# Patient Record
Sex: FEMALE | Race: OTHER | NOT HISPANIC OR LATINO | URBAN - METROPOLITAN AREA
[De-identification: names, ages, dates, MRNs, and addresses within clinical notes are randomized per-mention and may not be internally consistent; named-entity substitution may affect disease eponyms.]

---

## 2021-10-26 ENCOUNTER — EMERGENCY (EMERGENCY)
Facility: HOSPITAL | Age: 46
LOS: 1 days | Discharge: HOME | End: 2021-10-26
Attending: EMERGENCY MEDICINE
Payer: SELF-PAY

## 2021-10-26 VITALS
DIASTOLIC BLOOD PRESSURE: 92 MMHG | HEIGHT: 67 IN | WEIGHT: 149.91 LBS | OXYGEN SATURATION: 98 % | HEART RATE: 81 BPM | RESPIRATION RATE: 17 BRPM | SYSTOLIC BLOOD PRESSURE: 153 MMHG | TEMPERATURE: 98 F

## 2021-10-26 PROCEDURE — 99283 EMERGENCY DEPT VISIT LOW MDM: CPT

## 2021-10-26 RX ORDER — AMOXICILLIN 250 MG/5ML
1 SUSPENSION, RECONSTITUTED, ORAL (ML) ORAL
Qty: 10 | Refills: 0
Start: 2021-10-26 | End: 2021-10-30

## 2021-10-26 NOTE — ED PROVIDER NOTE - OBJECTIVE STATEMENT
46 year old female with no history presents with b/l Ear pain, L more pain than R x2 months. Pain is worse at nightime. No medication taken for pain. Denies fevers, HA, dizziness, discharge, use of qtips. 46 year old female with no history presents with b/l Ear pain, L more pain than R x2 months. Pain is worse at nightime. No medication taken for pain. Denies fevers, HA, dizziness, discharge, use of qtips.    LYUDMILA: left ear pain.

## 2021-10-26 NOTE — ED PROVIDER NOTE - CLINICAL SUMMARY MEDICAL DECISION MAKING FREE TEXT BOX
46 year old female with b/l ear pain. Right ear normal. Left ear - TNTC 1 mm white papules on TM. Will treat with antibiotics for otitis media and refer patient to ENT for further evaluation.

## 2021-10-26 NOTE — ED PROVIDER NOTE - NSFOLLOWUPCLINICS_GEN_ALL_ED_FT
[facility]  ENT  [address] [place] [address]  Phone: [phone]  Fax: [phone]

## 2021-10-26 NOTE — ED PROVIDER NOTE - NSFOLLOWUPINSTRUCTIONS_ED_ALL_ED_FT
Follow up with the ENT doctor with in 24-48 hours.     Take antibiotics as prescribed, until all of the medicine is gone.     If you experience any new or worsening symptoms or if you are concerned you can always come back to the emergency for a re-evaluation.    If there were any prescriptions given to you during the visit today take them as prescribed. If you have any questions you can ask the pharmacist.

## 2021-10-26 NOTE — ED PROVIDER NOTE - CROS ED ROS STATEMENT
all other ROS negative except as per HPI

## 2021-10-26 NOTE — ED PROVIDER NOTE - PHYSICAL EXAMINATION
Left ear: TNTC 1 mm white papules on TM. Visualized parts of middle ear are normal. No erythema, swelling or exudate of external ear canal.    Right ear - normal. TM clear. Left ear: TNTC 1 mm white papules on TM. Visualized parts of middle ear are normal. No erythema, swelling or exudate of external ear canal.    Right ear - normal. TM clear.    LYUDMILA: left ear - TNTC 1mm white papules. no hyphae, no or swelling erythema of ear canal. no pain with manipulation of ear.

## 2021-10-26 NOTE — ED PROVIDER NOTE - PATIENT PORTAL LINK FT
You can access the FollowMyHealth Patient Portal offered by [facility] by registering at the following website: [email or web address]. By joining [employer]’s FollowMyHealth portal, you will also be able to view your health information using other applications (apps) compatible with our system.

## 2021-11-08 ENCOUNTER — RESULT REVIEW (OUTPATIENT)
Age: 46
End: 2021-11-08

## 2021-11-08 ENCOUNTER — OUTPATIENT (OUTPATIENT)
Dept: OUTPATIENT SERVICES | Facility: HOSPITAL | Age: 46
LOS: 1 days | Discharge: HOME | End: 2021-11-08

## 2021-11-08 ENCOUNTER — OUTPATIENT (OUTPATIENT)
Dept: OUTPATIENT SERVICES | Facility: HOSPITAL | Age: 46
LOS: 1 days | End: 2021-11-08
Payer: COMMERCIAL

## 2021-11-08 ENCOUNTER — APPOINTMENT (OUTPATIENT)
Dept: ULTRASOUND IMAGING | Facility: CLINIC | Age: 46
End: 2021-11-08
Payer: COMMERCIAL

## 2021-11-08 ENCOUNTER — APPOINTMENT (OUTPATIENT)
Dept: OTOLARYNGOLOGY | Facility: CLINIC | Age: 46
End: 2021-11-08
Payer: COMMERCIAL

## 2021-11-08 VITALS
HEIGHT: 67 IN | HEART RATE: 81 BPM | SYSTOLIC BLOOD PRESSURE: 126 MMHG | BODY MASS INDEX: 23.54 KG/M2 | WEIGHT: 150 LBS | DIASTOLIC BLOOD PRESSURE: 75 MMHG

## 2021-11-08 DIAGNOSIS — Z86.39 PERSONAL HISTORY OF OTHER ENDOCRINE, NUTRITIONAL AND METABOLIC DISEASE: ICD-10-CM

## 2021-11-08 DIAGNOSIS — E04.9 NONTOXIC GOITER, UNSPECIFIED: ICD-10-CM

## 2021-11-08 PROBLEM — Z78.9 OTHER SPECIFIED HEALTH STATUS: Chronic | Status: ACTIVE | Noted: 2021-10-26

## 2021-11-08 PROCEDURE — 76536 US EXAM OF HEAD AND NECK: CPT

## 2021-11-08 PROCEDURE — 76536 US EXAM OF HEAD AND NECK: CPT | Mod: 26

## 2021-11-08 PROCEDURE — 99203 OFFICE O/P NEW LOW 30 MIN: CPT

## 2021-11-08 RX ORDER — ASCORBIC ACID 500 MG
TABLET ORAL
Refills: 0 | Status: ACTIVE | COMMUNITY

## 2021-11-08 NOTE — ASSESSMENT
[FreeTextEntry1] : treated otitis\par thyroid disorder\par us thyroid\par endocrinolgy referral\par f/u after above

## 2021-11-08 NOTE — REASON FOR VISIT
[Initial Evaluation] : an initial evaluation for [Formal Caregiver] : formal caregiver [FreeTextEntry2] : Left otitis media

## 2021-11-08 NOTE — HISTORY OF PRESENT ILLNESS
[de-identified] : 46 year old female initial visit for Left otitis media, associated symptoms of otalgia, otorrhea and itching\par States symptoms present for approximately 2 weeks, went to [facility], prescribed oral Amoxicillin, taken with relief. Denies changes with hearing and recent fevers. Also has R thyroid goiter which she states she has been aware of since 2017, [place] which [place] states was benign.

## 2021-11-08 NOTE — PHYSICAL EXAM
[Midline] : trachea located in midline position [Normal] : no rashes [de-identified] : R thyroid enlarged

## 2022-01-03 ENCOUNTER — APPOINTMENT (OUTPATIENT)
Dept: OTOLARYNGOLOGY | Facility: CLINIC | Age: 47
End: 2022-01-03

## 2022-01-05 DIAGNOSIS — E07.9 DISORDER OF THYROID, UNSPECIFIED: ICD-10-CM

## 2022-01-05 DIAGNOSIS — H66.90 OTITIS MEDIA, UNSPECIFIED, UNSPECIFIED EAR: ICD-10-CM

## 2022-02-09 ENCOUNTER — APPOINTMENT (OUTPATIENT)
Dept: ENDOCRINOLOGY | Facility: CLINIC | Age: 47
End: 2022-02-09
Payer: COMMERCIAL

## 2022-02-09 PROCEDURE — 99204 OFFICE O/P NEW MOD 45 MIN: CPT

## 2022-04-07 ENCOUNTER — APPOINTMENT (OUTPATIENT)
Dept: SURGERY | Facility: CLINIC | Age: 47
End: 2022-04-07
Payer: COMMERCIAL

## 2022-04-07 DIAGNOSIS — E04.9 NONTOXIC GOITER, UNSPECIFIED: ICD-10-CM

## 2022-04-07 DIAGNOSIS — E04.2 NONTOXIC MULTINODULAR GOITER: ICD-10-CM

## 2022-04-07 DIAGNOSIS — E07.9 DISORDER OF THYROID, UNSPECIFIED: ICD-10-CM

## 2022-04-07 PROCEDURE — 99204 OFFICE O/P NEW MOD 45 MIN: CPT

## 2022-04-07 NOTE — REASON FOR VISIT
[Initial Consultation] : an initial consultation for [FreeTextEntry2] : a multinodular goiter [Friend] : friend

## 2022-04-07 NOTE — PHYSICAL EXAM
[Midline] : located in midline position [Normal] : orientation to person, place, and time: normal [de-identified] : Extremities: GUNN x 4.   Skin: No obvious skin lesions.   Voice: clear

## 2022-04-13 ENCOUNTER — APPOINTMENT (OUTPATIENT)
Dept: SURGERY | Facility: CLINIC | Age: 47
End: 2022-04-13
Payer: COMMERCIAL

## 2022-04-13 PROCEDURE — 99442: CPT

## 2022-04-13 NOTE — REASON FOR VISIT
[Friend] : friend [Verbal consent obtained from patient] : the patient, [unfilled] [Follow-Up: _____] : a [unfilled] follow-up visit

## 2022-04-22 ENCOUNTER — APPOINTMENT (OUTPATIENT)
Dept: CT IMAGING | Facility: CLINIC | Age: 47
End: 2022-04-22
Payer: COMMERCIAL

## 2022-04-22 ENCOUNTER — OUTPATIENT (OUTPATIENT)
Dept: OUTPATIENT SERVICES | Facility: HOSPITAL | Age: 47
LOS: 1 days | End: 2022-04-22
Payer: COMMERCIAL

## 2022-04-22 DIAGNOSIS — E04.2 NONTOXIC MULTINODULAR GOITER: ICD-10-CM

## 2022-04-22 PROCEDURE — 70491 CT SOFT TISSUE NECK W/DYE: CPT

## 2022-04-22 PROCEDURE — 70491 CT SOFT TISSUE NECK W/DYE: CPT | Mod: 26

## 2022-04-26 ENCOUNTER — NON-APPOINTMENT (OUTPATIENT)
Age: 47
End: 2022-04-26

## 2022-05-02 ENCOUNTER — APPOINTMENT (OUTPATIENT)
Dept: SURGERY | Facility: CLINIC | Age: 47
End: 2022-05-02

## 2022-05-05 ENCOUNTER — APPOINTMENT (OUTPATIENT)
Dept: SURGERY | Facility: CLINIC | Age: 47
End: 2022-05-05
Payer: COMMERCIAL

## 2022-05-05 PROCEDURE — 99441: CPT

## 2022-05-16 ENCOUNTER — NON-APPOINTMENT (OUTPATIENT)
Age: 47
End: 2022-05-16

## 2022-05-24 ENCOUNTER — APPOINTMENT (OUTPATIENT)
Dept: INTERNAL MEDICINE | Facility: CLINIC | Age: 47
End: 2022-05-24
Payer: COMMERCIAL

## 2022-05-24 ENCOUNTER — OUTPATIENT (OUTPATIENT)
Dept: OUTPATIENT SERVICES | Facility: HOSPITAL | Age: 47
LOS: 1 days | End: 2022-05-24
Payer: SELF-PAY

## 2022-05-24 VITALS
DIASTOLIC BLOOD PRESSURE: 72 MMHG | BODY MASS INDEX: 24.17 KG/M2 | WEIGHT: 154 LBS | SYSTOLIC BLOOD PRESSURE: 146 MMHG | HEIGHT: 67 IN | HEART RATE: 96 BPM | OXYGEN SATURATION: 98 %

## 2022-05-24 DIAGNOSIS — Z00.00 ENCOUNTER FOR GENERAL ADULT MEDICAL EXAMINATION W/OUT ABNORMAL FINDINGS: ICD-10-CM

## 2022-05-24 DIAGNOSIS — R91.1 SOLITARY PULMONARY NODULE: ICD-10-CM

## 2022-05-24 DIAGNOSIS — Z82.49 FAMILY HISTORY OF ISCHEMIC HEART DISEASE AND OTHER DISEASES OF THE CIRCULATORY SYSTEM: ICD-10-CM

## 2022-05-24 DIAGNOSIS — H92.12 OTORRHEA, LEFT EAR: ICD-10-CM

## 2022-05-24 DIAGNOSIS — I10 ESSENTIAL (PRIMARY) HYPERTENSION: ICD-10-CM

## 2022-05-24 DIAGNOSIS — Z86.69 PERSONAL HISTORY OF OTHER DISEASES OF THE NERVOUS SYSTEM AND SENSE ORGANS: ICD-10-CM

## 2022-05-24 DIAGNOSIS — L29.9 PRURITUS, UNSPECIFIED: ICD-10-CM

## 2022-05-24 DIAGNOSIS — R91.8 OTHER NONSPECIFIC ABNORMAL FINDING OF LUNG FIELD: ICD-10-CM

## 2022-05-24 DIAGNOSIS — H92.02 OTALGIA, LEFT EAR: ICD-10-CM

## 2022-05-24 PROCEDURE — 83036 HEMOGLOBIN GLYCOSYLATED A1C: CPT

## 2022-05-24 PROCEDURE — ZZZZZ: CPT

## 2022-05-24 PROCEDURE — 86803 HEPATITIS C AB TEST: CPT

## 2022-05-24 PROCEDURE — 85025 COMPLETE CBC W/AUTO DIFF WBC: CPT

## 2022-05-24 PROCEDURE — 80061 LIPID PANEL: CPT

## 2022-05-24 PROCEDURE — 87389 HIV-1 AG W/HIV-1&-2 AB AG IA: CPT

## 2022-05-24 PROCEDURE — 80053 COMPREHEN METABOLIC PANEL: CPT

## 2022-05-24 PROCEDURE — G0463: CPT

## 2022-05-24 NOTE — PHYSICAL EXAM
[No Acute Distress] : no acute distress [Well Nourished] : well nourished [Well Developed] : well developed [Well-Appearing] : well-appearing [Normal Sclera/Conjunctiva] : normal sclera/conjunctiva [PERRL] : pupils equal round and reactive to light [EOMI] : extraocular movements intact [Normal Outer Ear/Nose] : the outer ears and nose were normal in appearance [Normal Oropharynx] : the oropharynx was normal [Normal TMs] : both tympanic membranes were normal [No Respiratory Distress] : no respiratory distress  [No Accessory Muscle Use] : no accessory muscle use [Clear to Auscultation] : lungs were clear to auscultation bilaterally [Normal Rate] : normal rate  [Regular Rhythm] : with a regular rhythm [Normal S1, S2] : normal S1 and S2 [No Murmur] : no murmur heard [No Edema] : there was no peripheral edema [Soft] : abdomen soft [Non Tender] : non-tender [No HSM] : no HSM [Normal Bowel Sounds] : normal bowel sounds [No CVA Tenderness] : no CVA  tenderness [No Spinal Tenderness] : no spinal tenderness [No Joint Swelling] : no joint swelling [Grossly Normal Strength/Tone] : grossly normal strength/tone [No Rash] : no rash [Coordination Grossly Intact] : coordination grossly intact [No Focal Deficits] : no focal deficits [Normal Gait] : normal gait [Normal Affect] : the affect was normal [Normal Insight/Judgement] : insight and judgment were intact [de-identified] : No exopthalmos [de-identified] : Bilateral diffuse enlarged goiter with lympahdenopathy [de-identified] : Midly distended in lower abdomen [de-identified] : Difficult to palpation lymph nodes on neck, however appreciated on recent CT of neck

## 2022-05-24 NOTE — REVIEW OF SYSTEMS
[Swollen Glands] : swollen glands [Fever] : no fever [Chills] : no chills [Fatigue] : no fatigue [Recent Change In Weight] : ~T no recent weight change [Discharge] : no discharge [Pain] : no pain [Vision Problems] : no vision problems [Earache] : no earache [Hearing Loss] : no hearing loss [Nasal Discharge] : no nasal discharge [Sore Throat] : no sore throat [Chest Pain] : no chest pain [Palpitations] : no palpitations [Lower Ext Edema] : no lower extremity edema [Shortness Of Breath] : no shortness of breath [Wheezing] : no wheezing [Cough] : no cough [Abdominal Pain] : no abdominal pain [Nausea] : no nausea [Constipation] : no constipation [Diarrhea] : diarrhea [Vomiting] : no vomiting [Dysuria] : no dysuria [Vaginal Discharge] : no vaginal discharge [Joint Pain] : no joint pain [Joint Swelling] : no joint swelling [Mole Changes] : no mole changes [Nail Changes] : no nail changes [Hair Changes] : no hair changes [Skin Rash] : no skin rash [Headache] : no headache [Dizziness] : no dizziness [Fainting] : no fainting [Memory Loss] : no memory loss [Unsteady Walking] : no ataxia [Anxiety] : no anxiety [Depression] : no depression [de-identified] : present in the neck

## 2022-05-24 NOTE — ASSESSMENT
[FreeTextEntry1] : 45 y/o female with pmh of enlarged thyroid with large bilateral nodules with mild compression on the trachea, enlarged right lateral neck mass, and incidental pulmonary nodules who presents to the clinic for new pt CPE and to establish care at the medicine clinic for the first time.

## 2022-05-24 NOTE — HISTORY OF PRESENT ILLNESS
[FreeTextEntry1] : New pt CPE [de-identified] : 45 y/o female with pmh of enlarged thyroid with large bilateral nodules with mild compression on the trachea, enlarged right lateral neck mass, and incidental pulmonary nodules who presents to the clinic for new pt CPE and to establish care at the medicine clinic for the first time.\par To note pt has a medical history of enlarged thyroid which was identified incidentally by ENT at an appointment for otitis media. The pt had an U/S done in 11/2021 which demonstrated cystic appearing nodules (4.5 cm on the right, 4.3 cm medial on the left, and 3.6 cm lateral on the left). She was referred to endocrinology and was worked up for thyroid studies and recommended to have an FNA at the time given the nodules were >1 cm in size, however there was a concern that insurance would not cover surgery or anesthesia. The pt was referred to head and neck surgery, who ordered a CT scan on 5/17 which demonstrated cystic soft tissue foci in lower right neck, enlarged thyroid glands with bilateral nodules with mild tracheal compression, now recommended a total thyroidectomy as the pt had cyst with >4 cm which is likely to indicate malignancy. The pt was agreeable to surgery after giving more thought to it and is pending a repeat U/S and MRI of the neck (5/26/22). 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 \par Today the pt is feeling well without any acute concerns. The pt denies having any hair, nail, or skin changes, palpitations, constipation, diarrhea, or menstrual cycle changes.

## 2022-05-24 NOTE — PLAN
[FreeTextEntry1] : \par Return to clinic when due for pre-operative clearance\par \par Case discussed with Dr. Sullivan\par \par Stephanie Galvez MD, PGY2\par Internal Medicine

## 2022-05-26 ENCOUNTER — APPOINTMENT (OUTPATIENT)
Dept: ULTRASOUND IMAGING | Facility: CLINIC | Age: 47
End: 2022-05-26
Payer: COMMERCIAL

## 2022-05-26 ENCOUNTER — OUTPATIENT (OUTPATIENT)
Dept: OUTPATIENT SERVICES | Facility: HOSPITAL | Age: 47
LOS: 1 days | End: 2022-05-26
Payer: SELF-PAY

## 2022-05-26 ENCOUNTER — APPOINTMENT (OUTPATIENT)
Dept: CT IMAGING | Facility: CLINIC | Age: 47
End: 2022-05-26

## 2022-05-26 ENCOUNTER — RESULT REVIEW (OUTPATIENT)
Age: 47
End: 2022-05-26

## 2022-05-26 ENCOUNTER — APPOINTMENT (OUTPATIENT)
Dept: MRI IMAGING | Facility: CLINIC | Age: 47
End: 2022-05-26
Payer: COMMERCIAL

## 2022-05-26 DIAGNOSIS — R22.1 LOCALIZED SWELLING, MASS AND LUMP, NECK: ICD-10-CM

## 2022-05-26 DIAGNOSIS — I10 ESSENTIAL (PRIMARY) HYPERTENSION: ICD-10-CM

## 2022-05-26 PROCEDURE — 70543 MRI ORBT/FAC/NCK W/O &W/DYE: CPT | Mod: 26

## 2022-05-26 PROCEDURE — 76536 US EXAM OF HEAD AND NECK: CPT | Mod: 26

## 2022-05-26 PROCEDURE — 82180 ASSAY OF ASCORBIC ACID: CPT

## 2022-05-26 PROCEDURE — 74261 CT COLONOGRAPHY DX: CPT

## 2022-05-26 PROCEDURE — 71250 CT THORAX DX C-: CPT | Mod: 26

## 2022-05-26 PROCEDURE — A9585: CPT

## 2022-05-26 PROCEDURE — 71250 CT THORAX DX C-: CPT

## 2022-05-26 PROCEDURE — 70543 MRI ORBT/FAC/NCK W/O &W/DYE: CPT

## 2022-05-26 PROCEDURE — 76536 US EXAM OF HEAD AND NECK: CPT

## 2022-05-27 ENCOUNTER — NON-APPOINTMENT (OUTPATIENT)
Age: 47
End: 2022-05-27

## 2022-05-27 DIAGNOSIS — D50.9 IRON DEFICIENCY ANEMIA, UNSPECIFIED: ICD-10-CM

## 2022-05-27 LAB
ALBUMIN SERPL ELPH-MCNC: 4.8 G/DL
ALP BLD-CCNC: 61 U/L
ALT SERPL-CCNC: 11 U/L
ANION GAP SERPL CALC-SCNC: 13 MMOL/L
AST SERPL-CCNC: 15 U/L
BASOPHILS # BLD AUTO: 0.05 K/UL
BASOPHILS NFR BLD AUTO: 0.8 %
BILIRUB SERPL-MCNC: 0.2 MG/DL
BUN SERPL-MCNC: 10 MG/DL
CALCIUM SERPL-MCNC: 9.7 MG/DL
CHLORIDE SERPL-SCNC: 108 MMOL/L
CHOLEST SERPL-MCNC: 163 MG/DL
CO2 SERPL-SCNC: 23 MMOL/L
CREAT SERPL-MCNC: 0.75 MG/DL
EGFR: 100 ML/MIN/1.73M2
EOSINOPHIL # BLD AUTO: 0.16 K/UL
EOSINOPHIL NFR BLD AUTO: 2.7 %
ESTIMATED AVERAGE GLUCOSE: 108 MG/DL
GLUCOSE SERPL-MCNC: 86 MG/DL
HBA1C MFR BLD HPLC: 5.4 %
HCT VFR BLD CALC: 28.7 %
HCV AB SER QL: NONREACTIVE
HCV S/CO RATIO: 0.12 S/CO
HDLC SERPL-MCNC: 52 MG/DL
HGB BLD-MCNC: 7.9 G/DL
HIV1+2 AB SPEC QL IA.RAPID: NONREACTIVE
IMM GRANULOCYTES NFR BLD AUTO: 0 %
LDLC SERPL CALC-MCNC: 91 MG/DL
LYMPHOCYTES # BLD AUTO: 2.59 K/UL
LYMPHOCYTES NFR BLD AUTO: 43.8 %
MAN DIFF?: NORMAL
MCHC RBC-ENTMCNC: 16.7 PG
MCHC RBC-ENTMCNC: 27.5 GM/DL
MCV RBC AUTO: 60.7 FL
MONOCYTES # BLD AUTO: 0.61 K/UL
MONOCYTES NFR BLD AUTO: 10.3 %
NEUTROPHILS # BLD AUTO: 2.5 K/UL
NEUTROPHILS NFR BLD AUTO: 42.4 %
NONHDLC SERPL-MCNC: 111 MG/DL
PLATELET # BLD AUTO: 388 K/UL
POTASSIUM SERPL-SCNC: 4.8 MMOL/L
PROT SERPL-MCNC: 8.1 G/DL
RBC # BLD: 4.73 M/UL
RBC # FLD: 21.9 %
SODIUM SERPL-SCNC: 143 MMOL/L
TRIGL SERPL-MCNC: 100 MG/DL
WBC # FLD AUTO: 5.91 K/UL

## 2022-05-27 RX ORDER — CHLORHEXIDINE GLUCONATE 4 %
325 (65 FE) LIQUID (ML) TOPICAL DAILY
Qty: 30 | Refills: 0 | Status: ACTIVE | COMMUNITY
Start: 2022-05-27 | End: 1900-01-01

## 2022-05-31 ENCOUNTER — NON-APPOINTMENT (OUTPATIENT)
Age: 47
End: 2022-05-31

## 2022-05-31 ENCOUNTER — APPOINTMENT (OUTPATIENT)
Dept: OTOLARYNGOLOGY | Facility: CLINIC | Age: 47
End: 2022-05-31
Payer: COMMERCIAL

## 2022-05-31 VITALS
WEIGHT: 154 LBS | HEART RATE: 86 BPM | TEMPERATURE: 98.3 F | SYSTOLIC BLOOD PRESSURE: 134 MMHG | DIASTOLIC BLOOD PRESSURE: 77 MMHG | BODY MASS INDEX: 24.17 KG/M2 | HEIGHT: 67 IN

## 2022-05-31 PROCEDURE — 31575 DIAGNOSTIC LARYNGOSCOPY: CPT

## 2022-05-31 PROCEDURE — 99244 OFF/OP CNSLTJ NEW/EST MOD 40: CPT | Mod: 25

## 2022-05-31 NOTE — ASSESSMENT
[FreeTextEntry1] : Pt to get FNA of bot thyroid nodules and the R supraclavicular lesion and will f/u.  If benign, observation would be a reasonable option.

## 2022-05-31 NOTE — CONSULT LETTER
[Dear  ___] : Dear  [unfilled], [Sincerely,] : Sincerely, [Consult Letter:] : I had the pleasure of evaluating your patient, [unfilled]. [Please see my note below.] : Please see my note below. [Consult Closing:] : Thank you very much for allowing me to participate in the care of this patient.  If you have any questions, please do not hesitate to contact me. [FreeTextEntry2] : Stephanie Galvez MD ([place], [place]) [FreeTextEntry3] : Arnold Urbina MD, FACS\par Chief of Otolaryngology at [facility]\par \par Dept. of Otolaryngology\par [facility]

## 2022-05-31 NOTE — PHYSICAL EXAM
[Midline] : trachea located in midline position [Laryngoscopy Performed] : laryngoscopy was performed, see procedure section for findings [Normal] : no rashes [de-identified] : Bilateral palpable thyroid nodules.  R supraclavicular cystic lesion not palpable.

## 2022-05-31 NOTE — DATA REVIEWED
[de-identified] : 2/15/22\par Thyr Ab's - Negative, TSH = 0.92 [de-identified] : \par  US Thyroid + Parathyroid             Final\par \par No Documents Attached\par \par \par \par \par   EXAM:  US THYROID AND PARATHYROID\par \par \par PROCEDURE DATE:  11/08/2021\par \par \par \par INTERPRETATION:  CLINICAL INFORMATION: ENLARGE THYROID Admitting Dxs: E04.9 NONTOXIC GOITER, UNSPECIFIED\par \par COMPARISON: None available.\par \par TECHNIQUE: Sonography of the thyroid.\par \par FINDINGS:\par Right Lobe: 5.8 cm x  3.1 cm x 3.1 cm. Dominant 4.5 cm complex cystic nodule.\par \par Left Lobe: 6.3 cm x 3.2 cm x 3.5 cm. Complex cystic nodules measuring 4.3 medially and 3.6 cm laterally respectively.\par \par Isthmus: 7 mm.\par \par Cervical Lymph Nodes: No enlarged or abnormal morphology cervical nodes.\par \par IMPRESSION:\par \par Bilateral complex cystic thyroid nodules.\par \par \par \par --- End of Report ---\par \par \par \par \par \par \par ARRON GUTIÉRREZ MD; Attending Radiologist\par This document has been electronically signed. Nov 14 2021 11:21AM\par \par  \par \par  Ordered by: RUTH FAUST       Collected/Examined: 08Nov2021 04:07PM       \par Verification Required       Stage: Final       \par  Performed at: [facility] [place]       Resulted: 14Nov2021 11:15AM       Last Updated: 14Nov2021 11:24AM       Accession: [id number]        [de-identified] : IMAGES REVIEWED:\par \par   CT Neck Soft Tissue w/ IV Cont             Amended\par \par No Documents Attached\par \par \par \par \par   EXAM: [id number] - CT NECK SOFT TISSUE IC  - ORDERED BY: TARYN CHI\par \par \par PROCEDURE DATE:  04/22/2022\par \par ADDENDUM:  Please note that this addendum is being done to correct the ordering Physician's demographic information noted above.  There is no change in the interpretation/impression of this report.\par \par \par \par INTERPRETATION:  INDICATIONS:  Multinodular goiter. Evaluate for tracheal compression\par \par TECHNIQUE:   Axial images were obtained following the intravenous administration of contrast material. Sagittal and coronal reformatted images were obtained. 90 cc Omnipaque 350 were administered. 10 cc were discarded.\par \par COMPARISON EXAMINATION:  Neck ultrasound 11/8/2021\par \par FINDINGS:\par AERODIGESTIVE TRACT:  Intrinsically normal in appearance. See below.\par LYMPH NODES:  2 adjacent cystic appearing nodules are seen in the right level IV/level V region measuring up to 1.4 x 1.2 cm which may represent abnormal lymph nodes.\par No other enlarged or pathologic-appearing lymph nodes are seen.\par PAROTID GLANDS:  Normal.\par SUBMANDIBULAR GLANDS:  Normal.\par THYROID GLAND:  Bilateral prominent complex nodules are seen producing thyroid enlargement with encroachment upon the trachea. The tracheal lumen measures 1.2 cm at its narrowest point as compared with 1.4 cm within the mediastinum. No significant esophageal impingement is seen.\par VISUALIZED PARANASAL SINUSES:  Small right maxillary polyp or retention cyst\par VISUALIZED TYMPANOMASTOID CAVITIES: Clear.\par BONES:  Normal.\par MISCELLANEOUS:  Subcentimeter right upper lobe pulmonary nodules are seen measuring up to 5 x 4 mm..\par \par IMPRESSION:\par Enlarged thyroid gland containing bilateral nodules with mild tracheal compression. No substernal extension.\par \par Cystic soft tissue foci within the lower right neck which may represent pathologic lymph nodes. Contrast-enhanced neck MR may be helpful for further evaluation.\par \par Subcentimeter pulmonary nodules for which chest CT is advised for further evaluation.\par \par --- End of Report ---\par \par \par ***Please see the addendum at the top of this report. It may contain additional important information or changes.****\par \par \par \par HUBER BARRETO MD; Attending Radiologist\par This document has been electronically signed. Apr 27 2022 11:34AM\par Addend:HUBER BARRETO MD; Attending Radiologist\par This addendum was electronically signed on: May 17 2022  1:57PM.\par \par  \par \par  Ordered by: TARYN CHI       Collected/Examined: 22Apr2022 02:33PM       \par Verified by: TARYN CHI 17May2022 05:18PM       \par  Result Communication: No patient communication needed at this time;\par Stage: Amended       \par  Performed at: [facility] [place]       Resulted: 27Apr2022 11:16AM       Last Updated: 17May2022 05:18PM       Accession: [id number]

## 2022-05-31 NOTE — PROCEDURE
[Unable to Cooperate with Mirror] : patient unable to cooperate with mirror [Obstruction] : acute airway obstruction evaluation [Topical Lidocaine] : topical lidocaine [Oxymetazoline HCl] : oxymetazoline HCl [Normal] : posterior cricoid area had healthy pink mucosa in the interarytenoid area and the esophageal inlet [Flexible Endoscope] : examined with the flexible endoscope [Serial Number: ___] : Serial Number: [unfilled] [True Vocal Cords Paralysis] : no true vocal cord paralysis [True Vocal Cords Erythematous] : no true vocal cord edema [True Vocal Cords Baron's Nodules] : no true vocal cord nodules [Glottis Arytenoid Cartilages] : no arytenoid granulomas [Glottis Arytenoid Cartilages Erythema] : no arytenoid erythema

## 2022-06-01 ENCOUNTER — RESULT REVIEW (OUTPATIENT)
Age: 47
End: 2022-06-01

## 2022-06-01 ENCOUNTER — NON-APPOINTMENT (OUTPATIENT)
Age: 47
End: 2022-06-01

## 2022-06-03 ENCOUNTER — RESULT REVIEW (OUTPATIENT)
Age: 47
End: 2022-06-03

## 2022-06-03 ENCOUNTER — APPOINTMENT (OUTPATIENT)
Dept: ULTRASOUND IMAGING | Facility: IMAGING CENTER | Age: 47
End: 2022-06-03
Payer: COMMERCIAL

## 2022-06-03 ENCOUNTER — APPOINTMENT (OUTPATIENT)
Dept: MAMMOGRAPHY | Facility: IMAGING CENTER | Age: 47
End: 2022-06-03
Payer: COMMERCIAL

## 2022-06-03 ENCOUNTER — APPOINTMENT (OUTPATIENT)
Dept: CT IMAGING | Facility: CLINIC | Age: 47
End: 2022-06-03

## 2022-06-03 ENCOUNTER — OUTPATIENT (OUTPATIENT)
Dept: OUTPATIENT SERVICES | Facility: HOSPITAL | Age: 47
LOS: 1 days | End: 2022-06-03
Payer: COMMERCIAL

## 2022-06-03 DIAGNOSIS — Z00.8 ENCOUNTER FOR OTHER GENERAL EXAMINATION: ICD-10-CM

## 2022-06-03 PROCEDURE — 77067 SCR MAMMO BI INCL CAD: CPT

## 2022-06-03 PROCEDURE — 10006 FNA BX W/US GDN EA ADDL: CPT

## 2022-06-03 PROCEDURE — 77063 BREAST TOMOSYNTHESIS BI: CPT | Mod: 26

## 2022-06-03 PROCEDURE — 77067 SCR MAMMO BI INCL CAD: CPT | Mod: 26

## 2022-06-03 PROCEDURE — 88173 CYTOPATH EVAL FNA REPORT: CPT | Mod: 26

## 2022-06-03 PROCEDURE — 10005 FNA BX W/US GDN 1ST LES: CPT

## 2022-06-03 PROCEDURE — 77063 BREAST TOMOSYNTHESIS BI: CPT

## 2022-06-06 DIAGNOSIS — R22.1 LOCALIZED SWELLING, MASS AND LUMP, NECK: ICD-10-CM

## 2022-06-06 DIAGNOSIS — R91.8 OTHER NONSPECIFIC ABNORMAL FINDING OF LUNG FIELD: ICD-10-CM

## 2022-06-06 DIAGNOSIS — E04.2 NONTOXIC MULTINODULAR GOITER: ICD-10-CM

## 2022-06-06 LAB — VIT C SERPL-MCNC: 0.7 MG/DL

## 2022-06-09 ENCOUNTER — APPOINTMENT (OUTPATIENT)
Dept: SURGERY | Facility: CLINIC | Age: 47
End: 2022-06-09
Payer: COMMERCIAL

## 2022-06-09 DIAGNOSIS — E04.1 NONTOXIC SINGLE THYROID NODULE: ICD-10-CM

## 2022-06-09 DIAGNOSIS — E04.9 NONTOXIC GOITER, UNSPECIFIED: ICD-10-CM

## 2022-06-09 DIAGNOSIS — E04.2 NONTOXIC MULTINODULAR GOITER: ICD-10-CM

## 2022-06-09 DIAGNOSIS — R22.1 LOCALIZED SWELLING, MASS AND LUMP, NECK: ICD-10-CM

## 2022-06-09 DIAGNOSIS — J39.8 OTHER SPECIFIED DISEASES OF UPPER RESPIRATORY TRACT: ICD-10-CM

## 2022-06-09 PROCEDURE — 99441: CPT

## 2022-06-09 NOTE — ED PROVIDER NOTE - NSICDXPASTMEDICALHX_GEN_ALL_CORE_FT
Airway patent, TM normal bilaterally, normal appearing mouth, nose, throat, neck supple with full range of motion, no cervical adenopathy.
PAST MEDICAL HISTORY:  No pertinent past medical history

## 2022-12-28 ENCOUNTER — NON-APPOINTMENT (OUTPATIENT)
Age: 47
End: 2022-12-28

## 2023-08-17 ENCOUNTER — LAB SERVICES (OUTPATIENT)
Dept: LAB | Age: 48
End: 2023-08-17

## 2023-08-17 DIAGNOSIS — E55.9 VITAMIN D DEFICIENCY: ICD-10-CM

## 2023-08-17 DIAGNOSIS — Z00.00 ROUTINE GENERAL MEDICAL EXAMINATION AT A HEALTH CARE FACILITY: ICD-10-CM

## 2023-08-17 DIAGNOSIS — R73.09 ABNORMAL GLUCOSE: ICD-10-CM

## 2023-08-17 LAB
25(OH)D3+25(OH)D2 SERPL-MCNC: 14 NG/ML (ref 30–100)
CHOLEST SERPL-MCNC: 144 MG/DL
CHOLEST/HDLC SERPL: 2.7 {RATIO}
HBA1C MFR BLD: 5.6 % (ref 4.5–5.6)
HDLC SERPL-MCNC: 53 MG/DL
LDLC SERPL CALC-MCNC: 76 MG/DL
NONHDLC SERPL-MCNC: 91 MG/DL
RUBV IGG SERPL IA-ACNC: 125.5 UNITS/ML
TRIGL SERPL-MCNC: 74 MG/DL
TSH SERPL-ACNC: 0.19 MCUNITS/ML (ref 0.35–5)

## 2023-08-17 PROCEDURE — PSEU9567: Performed by: CLINICAL MEDICAL LABORATORY

## 2023-08-17 PROCEDURE — 87591 N.GONORRHOEAE DNA AMP PROB: CPT | Performed by: CLINICAL MEDICAL LABORATORY

## 2023-08-17 PROCEDURE — 86762 RUBELLA ANTIBODY: CPT | Performed by: CLINICAL MEDICAL LABORATORY

## 2023-08-17 PROCEDURE — 87491 CHLMYD TRACH DNA AMP PROBE: CPT | Performed by: CLINICAL MEDICAL LABORATORY

## 2023-08-17 PROCEDURE — 86735 MUMPS ANTIBODY: CPT | Performed by: CLINICAL MEDICAL LABORATORY

## 2023-08-17 PROCEDURE — PSEU9049 QUANTIFERON TB PLUS PB BKR PSEUDO CODE: Performed by: CLINICAL MEDICAL LABORATORY

## 2023-08-17 PROCEDURE — PSEU9913 CHLAMYDIAGONORRHOEAE BY NUCLEIC ACID AMPLIFICATION PB BKR PSEUDO CODE: Performed by: CLINICAL MEDICAL LABORATORY

## 2023-08-17 PROCEDURE — PSEU8230 VARICELLA ZOSTER IMMUNITY IGG PB BKR PSEUDO CODE: Performed by: CLINICAL MEDICAL LABORATORY

## 2023-08-17 PROCEDURE — 36415 COLL VENOUS BLD VENIPUNCTURE: CPT | Performed by: CLINICAL MEDICAL LABORATORY

## 2023-08-17 PROCEDURE — 83036 HEMOGLOBIN GLYCOSYLATED A1C: CPT | Performed by: CLINICAL MEDICAL LABORATORY

## 2023-08-17 PROCEDURE — 86787 VARICELLA-ZOSTER ANTIBODY: CPT | Performed by: CLINICAL MEDICAL LABORATORY

## 2023-08-17 PROCEDURE — 86480 TB TEST CELL IMMUN MEASURE: CPT | Performed by: CLINICAL MEDICAL LABORATORY

## 2023-08-17 PROCEDURE — PSEU8299 THYROID STIMULATING HORMONE PB BKR PSEUDO CODES: Performed by: CLINICAL MEDICAL LABORATORY

## 2023-08-17 PROCEDURE — PSEU8566 HEPATITIS B SURFACE ANTIBODY PB BKR PSEUDO CODE: Performed by: CLINICAL MEDICAL LABORATORY

## 2023-08-17 PROCEDURE — PSEU8553 RUBEOLA IMMUNITY IGG PB BKR PSEUDO CODE: Performed by: CLINICAL MEDICAL LABORATORY

## 2023-08-17 PROCEDURE — PSEU8542 MUMPS IMMUNE ANTIBODY IGG PB BKR PSEUDO CODE: Performed by: CLINICAL MEDICAL LABORATORY

## 2023-08-17 PROCEDURE — 86592 SYPHILIS TEST NON-TREP QUAL: CPT | Performed by: CLINICAL MEDICAL LABORATORY

## 2023-08-17 PROCEDURE — 80061 LIPID PANEL: CPT | Performed by: CLINICAL MEDICAL LABORATORY

## 2023-08-17 PROCEDURE — PSEU8571 RUBELLA ANTIBODY IGG PB BKR PSEUDO CODE: Performed by: CLINICAL MEDICAL LABORATORY

## 2023-08-17 PROCEDURE — PSEU8229 VITAMIN D -25 HYDROXY PB BKR PSEUDO CODE: Performed by: CLINICAL MEDICAL LABORATORY

## 2023-08-17 PROCEDURE — 86765 RUBEOLA ANTIBODY: CPT | Performed by: CLINICAL MEDICAL LABORATORY

## 2023-08-17 PROCEDURE — 84443 ASSAY THYROID STIM HORMONE: CPT | Performed by: CLINICAL MEDICAL LABORATORY

## 2023-08-17 PROCEDURE — 82306 VITAMIN D 25 HYDROXY: CPT | Performed by: CLINICAL MEDICAL LABORATORY

## 2023-08-17 PROCEDURE — PSEU8570 RPR PB BKR PSEUDO CODE: Performed by: CLINICAL MEDICAL LABORATORY

## 2023-08-17 PROCEDURE — 86706 HEP B SURFACE ANTIBODY: CPT | Performed by: CLINICAL MEDICAL LABORATORY

## 2023-08-18 LAB
GAMMA INTERFERON BACKGROUND BLD IA-ACNC: 0.09 IU/ML
HBV SURFACE AB SER-ACNC: <3.1 MUNITS/ML
M TB IFN-G BLD-IMP: NEGATIVE
M TB IFN-G CD4+ BCKGRND COR BLD-ACNC: 0.07 IU/ML
M TB IFN-G CD4+CD8+ BCKGRND COR BLD-ACNC: 0.1 IU/ML
MEV IGG SER IA-ACNC: 207 AU/ML
MEV IGG SER QL IA: NORMAL
MITOGEN IGNF BCKGRD COR BLD-ACNC: 9.15 IU/ML
MUV IGG SER IA-ACNC: >300 AU/ML
MUV IGG SER QL IA: NORMAL
RPR SER QL: NONREACTIVE
VZV IGG SER IA-ACNC: >4000 INDEX
VZV IGG SER QL IA: NORMAL

## 2023-08-21 LAB
C TRACH RRNA UR QL NAA+PROBE: NEGATIVE
Lab: NORMAL
N GONORRHOEA RRNA UR QL NAA+PROBE: NEGATIVE

## 2024-02-21 NOTE — ED PROVIDER NOTE - CONTEXT
Render Post-Care Instructions In Note?: no
Duration Of Freeze Thaw-Cycle (Seconds): 5
Show Applicator Variable?: Yes
Detail Level: Detailed
Application Tool (Optional): Liquid Nitrogen Sprayer
Consent: The patient's consent was obtained including but not limited to risks of crusting, scabbing, blistering, scarring, darker or lighter pigmentary change, recurrence, incomplete removal and infection.
Post-Care Instructions: I reviewed with the patient in detail post-care instructions. Patient is to wear sunprotection, and avoid picking at any of the treated lesions. Pt may apply Vaseline to crusted or scabbing areas.
Number Of Freeze-Thaw Cycles: 2 freeze-thaw cycles
worse at night